# Patient Record
Sex: FEMALE | Race: WHITE | NOT HISPANIC OR LATINO | URBAN - METROPOLITAN AREA
[De-identification: names, ages, dates, MRNs, and addresses within clinical notes are randomized per-mention and may not be internally consistent; named-entity substitution may affect disease eponyms.]

---

## 2021-03-22 ENCOUNTER — EMERGENCY (EMERGENCY)
Facility: HOSPITAL | Age: 36
LOS: 1 days | Discharge: LEFT BEFORE DISCHARGE | End: 2021-03-22
Admitting: EMERGENCY MEDICINE
Payer: COMMERCIAL

## 2021-03-22 PROCEDURE — L9992: CPT

## 2021-03-22 NOTE — ED ADULT NURSE NOTE - EXPLANATION OF PATIENT'S REASON FOR LEAVING
pt stated she does not want to wait and will go somewhere else. speaking in complete sentences. nad noted Patient will bring the form the day of the procedure/Yes

## 2023-10-24 ENCOUNTER — EMERGENCY (EMERGENCY)
Facility: HOSPITAL | Age: 38
LOS: 1 days | Discharge: HOME | End: 2023-10-24
Admitting: EMERGENCY MEDICINE
Payer: MEDICAID

## 2023-10-24 VITALS
RESPIRATION RATE: 18 BRPM | SYSTOLIC BLOOD PRESSURE: 119 MMHG | HEART RATE: 87 BPM | TEMPERATURE: 98 F | DIASTOLIC BLOOD PRESSURE: 83 MMHG | OXYGEN SATURATION: 100 %

## 2023-10-24 PROCEDURE — 99283 EMERGENCY DEPT VISIT LOW MDM: CPT

## 2023-10-24 NOTE — ED ADULT TRIAGE NOTE - CHIEF COMPLAINT QUOTE
Patient c/o laceration to right thumb after using knife. Denies numbness/tingling and blood thinner use. Bleeding controlled. No hx.

## 2023-10-25 NOTE — ED PROVIDER NOTE - PATIENT PORTAL LINK FT
You can access the FollowMyHealth Patient Portal offered by [facility] by registering at the following website: [email or web address]. By joining [facility]’s FollowMyHealth portal, you will also be able to view your health information using other applications (apps) compatible with our system.

## 2023-10-25 NOTE — ED PROVIDER NOTE - PHYSICAL EXAMINATION
CONSTITUTIONAL: Well-appearing; well-nourished; in no apparent distress. Non-toxic appearing.   NEURO: Alert & oriented. Gait steady without assistance. Sensation to distal tip of right thumb intact but less when compared left thumb.  MUSCULOSKELETAL/EXTREMITIES: FROM of right thumb with passive and active motion. Cap refill < 2 seconds. Radial pulses 2+.  SKIN: ~ 2 cm avulsion noted to the radial side the distal right thumb. Warm; dry; no apparent lesions or exudate

## 2023-10-25 NOTE — ED PROVIDER NOTE - NSFOLLOWUPINSTRUCTIONS_ED_ALL_ED_FT
You were seen in the emergency department for a skin avulsion.    A skin avulsion is when a piece of skin is completely removed/detached.  There is no laceration or indication for sutures at this time.    Wound Care Instructions  Keep the wound clean, dry, and covered for the next 48 hours. Then clean wound 1-2 times a day, patting dry, then covering with wound with band-aid.    DO NOT APPLY OINTMENTS to the wound, keep the wound dry unless cleaning it.     If you develop fever or chills and the wound becomes red, swollen, or develops red streaks or foul-smelling drainage seek healthcare provider or return to the ER.      SEEK IMMEDIATE MEDICAL CARE IF YOU HAVE ANY OF THE FOLLOWING SYMPTOMS: swelling around the wound, worsening pain, drainage from the wound, red streaking going away from your wound, inability to move finger or toe near the laceration, or discoloration of skin near the laceration.

## 2023-10-25 NOTE — ED PROVIDER NOTE - CLINICAL SUMMARY MEDICAL DECISION MAKING FREE TEXT BOX
39 y/o female with no stated PMHx presents c/o right thumb laceration. Pt is right-hand dominant. Pt was slicing potatoes using crinkle-cut Mandolin when she cut her right thumb. Sensation intact but decreased per patient. Pt able to move her thumb without difficulty. Pt is unsure of her last tetanus but refuses one at this time. No other complaints.   VSS. Exam as noted above. Will clean and dress wound then discharge patient with wound care instructions and reasons to return to the ER.

## 2023-10-25 NOTE — ED PROVIDER NOTE - OBJECTIVE STATEMENT
39 y/o female with no stated PMHx presents c/o right thumb laceration. Pt is right-hand dominant. Pt was slicing potatoes using crinkle-cut Mandolin when she cut her right thumb. Sensation intact but decreased per patient. Pt able to move her thumb without difficulty. Pt is unsure of her last tetanus but refuses one at this time. No other complaints.

## 2023-10-25 NOTE — ED ADULT NURSE NOTE - OBJECTIVE STATEMENT
Pt arrives to the ED [id number], ambulatory, breathing even and unlabored in bed. Pt came to ED with a laceration on finger s/p cutting herself on accident with a knife. Pt denies chest pain, SOB, dizziness, headache, blurry vision, chills. Bed in lowest position, call bell within reach.

## 2023-10-26 NOTE — ED PROCEDURE NOTE - GENERAL PROCEDURE DETAILS
following digital block of 1mL of 2% Lidocaine (no Epi) to the right thumb, the area was cleansed with providone iodine, sterile water. Surgicel hemostat was applied with good hemostasis. Wound dressed in Luan guaze and Ace Wrap
